# Patient Record
Sex: FEMALE | Race: WHITE | NOT HISPANIC OR LATINO | Employment: UNEMPLOYED | URBAN - METROPOLITAN AREA
[De-identification: names, ages, dates, MRNs, and addresses within clinical notes are randomized per-mention and may not be internally consistent; named-entity substitution may affect disease eponyms.]

---

## 2018-01-13 ENCOUNTER — APPOINTMENT (OUTPATIENT)
Dept: RADIOLOGY | Facility: MEDICAL CENTER | Age: 28
DRG: 493 | End: 2018-01-13
Attending: EMERGENCY MEDICINE
Payer: MEDICAID

## 2018-01-14 ENCOUNTER — RESOLUTE PROFESSIONAL BILLING HOSPITAL PROF FEE (OUTPATIENT)
Dept: MEDSURG UNIT | Facility: MEDICAL CENTER | Age: 28
End: 2018-01-14
Payer: MEDICAID

## 2018-01-14 ENCOUNTER — APPOINTMENT (OUTPATIENT)
Dept: RADIOLOGY | Facility: MEDICAL CENTER | Age: 28
DRG: 493 | End: 2018-01-14
Attending: EMERGENCY MEDICINE
Payer: MEDICAID

## 2018-01-14 ENCOUNTER — HOSPITAL ENCOUNTER (INPATIENT)
Facility: MEDICAL CENTER | Age: 28
LOS: 3 days | DRG: 493 | End: 2018-01-18
Attending: EMERGENCY MEDICINE | Admitting: ORTHOPAEDIC SURGERY
Payer: MEDICAID

## 2018-01-14 DIAGNOSIS — S82.131A RIGHT MEDIAL TIBIAL PLATEAU FRACTURE, CLOSED, INITIAL ENCOUNTER: ICD-10-CM

## 2018-01-14 DIAGNOSIS — S82.121A CLOSED DISPLACED FRACTURE OF LATERAL CONDYLE OF RIGHT TIBIA, INITIAL ENCOUNTER: ICD-10-CM

## 2018-01-14 DIAGNOSIS — S00.81XA ABRASION OF FOREHEAD, INITIAL ENCOUNTER: ICD-10-CM

## 2018-01-14 DIAGNOSIS — S09.90XA CLOSED HEAD INJURY, INITIAL ENCOUNTER: ICD-10-CM

## 2018-01-14 DIAGNOSIS — S42.421A CLOSED DISPLACED COMMINUTED SUPRACONDYLAR FRACTURE OF RIGHT HUMERUS WITHOUT INTERCONDYLAR FRACTURE, INITIAL ENCOUNTER: ICD-10-CM

## 2018-01-14 PROBLEM — S42.351A: Status: ACTIVE | Noted: 2018-01-14

## 2018-01-14 LAB — HCG UR QL: NEGATIVE

## 2018-01-14 PROCEDURE — G0378 HOSPITAL OBSERVATION PER HR: HCPCS

## 2018-01-14 PROCEDURE — 73700 CT LOWER EXTREMITY W/O DYE: CPT | Mod: RT

## 2018-01-14 PROCEDURE — 70450 CT HEAD/BRAIN W/O DYE: CPT

## 2018-01-14 PROCEDURE — 305948 HCHG GREEN TRAUMA ACT PRE-NOTIFY NO CC

## 2018-01-14 PROCEDURE — 96374 THER/PROPH/DIAG INJ IV PUSH: CPT

## 2018-01-14 PROCEDURE — 73070 X-RAY EXAM OF ELBOW: CPT | Mod: RT

## 2018-01-14 PROCEDURE — 700105 HCHG RX REV CODE 258: Performed by: EMERGENCY MEDICINE

## 2018-01-14 PROCEDURE — 81025 URINE PREGNANCY TEST: CPT

## 2018-01-14 PROCEDURE — 700102 HCHG RX REV CODE 250 W/ 637 OVERRIDE(OP): Performed by: PHYSICIAN ASSISTANT

## 2018-01-14 PROCEDURE — 700111 HCHG RX REV CODE 636 W/ 250 OVERRIDE (IP): Performed by: EMERGENCY MEDICINE

## 2018-01-14 PROCEDURE — 99285 EMERGENCY DEPT VISIT HI MDM: CPT

## 2018-01-14 PROCEDURE — 73564 X-RAY EXAM KNEE 4 OR MORE: CPT | Mod: RT

## 2018-01-14 PROCEDURE — A9270 NON-COVERED ITEM OR SERVICE: HCPCS | Performed by: PHYSICIAN ASSISTANT

## 2018-01-14 PROCEDURE — 73200 CT UPPER EXTREMITY W/O DYE: CPT | Mod: RT

## 2018-01-14 RX ORDER — HYDROCODONE BITARTRATE AND ACETAMINOPHEN 10; 325 MG/1; MG/1
1-2 TABLET ORAL EVERY 4 HOURS PRN
Status: DISCONTINUED | OUTPATIENT
Start: 2018-01-14 | End: 2018-01-18 | Stop reason: HOSPADM

## 2018-01-14 RX ORDER — SODIUM CHLORIDE 9 MG/ML
INJECTION, SOLUTION INTRAVENOUS CONTINUOUS
Status: DISCONTINUED | OUTPATIENT
Start: 2018-01-15 | End: 2018-01-18 | Stop reason: HOSPADM

## 2018-01-14 RX ORDER — DIAZEPAM 5 MG/1
5 TABLET ORAL
Status: COMPLETED | OUTPATIENT
Start: 2018-01-14 | End: 2018-01-15

## 2018-01-14 RX ORDER — ONDANSETRON 2 MG/ML
4 INJECTION INTRAMUSCULAR; INTRAVENOUS
Status: DISCONTINUED | OUTPATIENT
Start: 2018-01-14 | End: 2018-01-18 | Stop reason: HOSPADM

## 2018-01-14 RX ORDER — HYDROMORPHONE HYDROCHLORIDE 2 MG/ML
2 INJECTION, SOLUTION INTRAMUSCULAR; INTRAVENOUS; SUBCUTANEOUS
Status: DISCONTINUED | OUTPATIENT
Start: 2018-01-14 | End: 2018-01-18 | Stop reason: HOSPADM

## 2018-01-14 RX ORDER — HYDROMORPHONE HYDROCHLORIDE 2 MG/ML
1 INJECTION, SOLUTION INTRAMUSCULAR; INTRAVENOUS; SUBCUTANEOUS
Status: COMPLETED | OUTPATIENT
Start: 2018-01-14 | End: 2018-01-14

## 2018-01-14 RX ADMIN — SODIUM CHLORIDE: 9 INJECTION, SOLUTION INTRAVENOUS at 23:13

## 2018-01-14 RX ADMIN — FENTANYL CITRATE 100 MCG: 50 INJECTION, SOLUTION INTRAMUSCULAR; INTRAVENOUS at 20:22

## 2018-01-14 RX ADMIN — HYDROCODONE BITARTRATE AND ACETAMINOPHEN 2 TABLET: 10; 325 TABLET ORAL at 23:06

## 2018-01-14 RX ADMIN — HYDROMORPHONE HYDROCHLORIDE 1 MG: 2 INJECTION INTRAMUSCULAR; INTRAVENOUS; SUBCUTANEOUS at 21:53

## 2018-01-14 ASSESSMENT — LIFESTYLE VARIABLES
EVER_SMOKED: YES
DO YOU DRINK ALCOHOL: NO
ALCOHOL_USE: NO

## 2018-01-14 ASSESSMENT — PATIENT HEALTH QUESTIONNAIRE - PHQ9
SUM OF ALL RESPONSES TO PHQ QUESTIONS 1-9: 0
SUM OF ALL RESPONSES TO PHQ9 QUESTIONS 1 AND 2: 0
2. FEELING DOWN, DEPRESSED, IRRITABLE, OR HOPELESS: NOT AT ALL
1. LITTLE INTEREST OR PLEASURE IN DOING THINGS: NOT AT ALL

## 2018-01-14 ASSESSMENT — PAIN SCALES - GENERAL: PAINLEVEL_OUTOF10: 8

## 2018-01-15 PROCEDURE — A9270 NON-COVERED ITEM OR SERVICE: HCPCS | Performed by: PHYSICIAN ASSISTANT

## 2018-01-15 PROCEDURE — 700102 HCHG RX REV CODE 250 W/ 637 OVERRIDE(OP): Performed by: PHYSICIAN ASSISTANT

## 2018-01-15 PROCEDURE — 700105 HCHG RX REV CODE 258: Performed by: EMERGENCY MEDICINE

## 2018-01-15 PROCEDURE — 700111 HCHG RX REV CODE 636 W/ 250 OVERRIDE (IP): Performed by: ORTHOPAEDIC SURGERY

## 2018-01-15 PROCEDURE — 770006 HCHG ROOM/CARE - MED/SURG/GYN SEMI*

## 2018-01-15 PROCEDURE — G0378 HOSPITAL OBSERVATION PER HR: HCPCS

## 2018-01-15 PROCEDURE — 700111 HCHG RX REV CODE 636 W/ 250 OVERRIDE (IP): Performed by: PHYSICIAN ASSISTANT

## 2018-01-15 RX ORDER — LORAZEPAM 1 MG/1
1 TABLET ORAL EVERY 4 HOURS PRN
Status: DISCONTINUED | OUTPATIENT
Start: 2018-01-15 | End: 2018-01-18 | Stop reason: HOSPADM

## 2018-01-15 RX ORDER — NICOTINE 21 MG/24HR
14 PATCH, TRANSDERMAL 24 HOURS TRANSDERMAL
Status: DISCONTINUED | OUTPATIENT
Start: 2018-01-15 | End: 2018-01-18 | Stop reason: HOSPADM

## 2018-01-15 RX ORDER — LORAZEPAM 2 MG/ML
1 INJECTION INTRAMUSCULAR ONCE
Status: COMPLETED | OUTPATIENT
Start: 2018-01-15 | End: 2018-01-15

## 2018-01-15 RX ADMIN — LORAZEPAM 1 MG: 2 INJECTION INTRAMUSCULAR; INTRAVENOUS at 06:16

## 2018-01-15 RX ADMIN — LORAZEPAM 1 MG: 1 TABLET ORAL at 21:32

## 2018-01-15 RX ADMIN — DIAZEPAM 5 MG: 5 TABLET ORAL at 05:40

## 2018-01-15 RX ADMIN — HYDROMORPHONE HYDROCHLORIDE 2 MG: 2 INJECTION INTRAMUSCULAR; INTRAVENOUS; SUBCUTANEOUS at 09:54

## 2018-01-15 RX ADMIN — HYDROMORPHONE HYDROCHLORIDE 2 MG: 2 INJECTION INTRAMUSCULAR; INTRAVENOUS; SUBCUTANEOUS at 13:41

## 2018-01-15 RX ADMIN — ENOXAPARIN SODIUM 40 MG: 100 INJECTION SUBCUTANEOUS at 07:42

## 2018-01-15 RX ADMIN — LORAZEPAM 1 MG: 1 TABLET ORAL at 11:03

## 2018-01-15 RX ADMIN — HYDROCODONE BITARTRATE AND ACETAMINOPHEN 2 TABLET: 10; 325 TABLET ORAL at 22:45

## 2018-01-15 RX ADMIN — LORAZEPAM 1 MG: 1 TABLET ORAL at 15:48

## 2018-01-15 RX ADMIN — HYDROMORPHONE HYDROCHLORIDE 2 MG: 2 INJECTION INTRAMUSCULAR; INTRAVENOUS; SUBCUTANEOUS at 18:30

## 2018-01-15 RX ADMIN — NICOTINE 14 MG: 14 PATCH, EXTENDED RELEASE TRANSDERMAL at 11:03

## 2018-01-15 RX ADMIN — HYDROMORPHONE HYDROCHLORIDE 2 MG: 2 INJECTION INTRAMUSCULAR; INTRAVENOUS; SUBCUTANEOUS at 21:30

## 2018-01-15 RX ADMIN — HYDROCODONE BITARTRATE AND ACETAMINOPHEN 1 TABLET: 10; 325 TABLET ORAL at 07:42

## 2018-01-15 RX ADMIN — HYDROMORPHONE HYDROCHLORIDE 2 MG: 2 INJECTION INTRAMUSCULAR; INTRAVENOUS; SUBCUTANEOUS at 05:40

## 2018-01-15 RX ADMIN — SODIUM CHLORIDE: 9 INJECTION, SOLUTION INTRAVENOUS at 21:46

## 2018-01-15 RX ADMIN — HYDROCODONE BITARTRATE AND ACETAMINOPHEN 2 TABLET: 10; 325 TABLET ORAL at 12:24

## 2018-01-15 RX ADMIN — SODIUM CHLORIDE: 9 INJECTION, SOLUTION INTRAVENOUS at 06:23

## 2018-01-15 RX ADMIN — HYDROCODONE BITARTRATE AND ACETAMINOPHEN 1 TABLET: 10; 325 TABLET ORAL at 16:48

## 2018-01-15 ASSESSMENT — PAIN SCALES - GENERAL
PAINLEVEL_OUTOF10: 8

## 2018-01-15 NOTE — PROGRESS NOTES
Hinged knee brace (unlocked) was delivered and fitted to patient RLE.  If any further assistance needed, please call extension [phone] or place order for Ortho Technician assistance as a communication order in [facility].

## 2018-01-15 NOTE — ED NOTES
No home medications confirmed by interview with patient at bedside  No abx in past 30 days  NKDA confirmed

## 2018-01-15 NOTE — PROGRESS NOTES
2 RN Skin Check:     Skin check performed. Patient has abrasion to top of right scalp near forehead. Some scattered bruising and scratches. No open wounds, no pressure wounds and no areas of breakdown noted. Splint to right arm. Swelling to lower right leg.

## 2018-01-15 NOTE — PROGRESS NOTES
Nurse reports that patient is smoker and asking for nicotine patch 14mg patch ordered  Patient is also very anxious likely secondary to pain and heroin withdrawal- atavan 1mg PO Q4prn ordered

## 2018-01-15 NOTE — CARE PLAN
Problem: Safety  Goal: Will remain free from injury  Outcome: PROGRESSING AS EXPECTED  Bed alarm refused, A&O x4, slipper socks, bed locked and in low position, call light and personal belongings with reach, report given to CNA, appropriate signs outside door, hourly rounding in place.     Problem: Venous Thromboembolism (VTW)/Deep Vein Thrombosis (DVT) Prevention:  Goal: Patient will participate in Venous Thrombosis (VTE)/Deep Vein Thrombosis (DVT)Prevention Measures  Outcome: PROGRESSING AS EXPECTED  SCD's in place. Lovenox given.     Problem: Pain Management  Goal: Pain level will decrease to patient's comfort goal  Outcome: PROGRESSING AS EXPECTED  Pt rates pain 8/10. Norco 2 tab given.     Problem: Psychosocial Needs:  Goal: Level of anxiety will decrease  Outcome: PROGRESSING AS EXPECTED  Pt anxious. Listen to the pt. Gave emotional support.

## 2018-01-15 NOTE — PROGRESS NOTES
RN reports that patient c/o heroin withdrawal  Sleeping when I arrived  C/o right knee/elbow pain  No foot pumps on  + EPL/FPL/IO  CT scans reviewed  Will need ORIF elbow/knee  Discussed with patient  Opioid analgesia available  NWB RUE/RLE  DVT proph with foot pumps/lovenox  Knee immobilizer  OR tomorrow

## 2018-01-15 NOTE — PROGRESS NOTES
Patient arrived to T324-2 from ED via gurney. Patient needed slide board and staff assistance to get to hospital bed. Patient has a fracture to right leg and right arm. Right arm is in splint. Pillows in use for additional support. Patient is A&Ox4, on room air. Patient reports a significant amount of pain since ED despite this RN administering 1mg IV dilaudid. Patient is also very anxious. This RN placed call to NICOLAS Murrell who is on call for Dr. Ghotra. Orders received for pain meds and anxiolytic. Per orders patient is NPO at midnight or the potential for surgery in the AM. PA reports the surgery may be done Tuesday morning. Dr. Ghotra to see patient in AM. Plan of care and safety precautions reviewed with patient. Call device in reach Will continue to monitor.
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
## 2018-01-15 NOTE — PROGRESS NOTES
Patient is anxious and tearful this morning. She is restless and unable to sit/lie still in bed. She reports to this RN she is a daily heroin user and is currently going through withdrawals. A call was placed to NICOLAS Toth, who is on call for Dr. Ghotra. A one-time dose of Ativan 1mg was ordered and it is requested that this be addressed with Dr. Ghotra during rounds with the possibility of bringing hospitalist on board to help manage withdrawals. Will discuss with day RN as well.

## 2018-01-15 NOTE — CARE PLAN
Problem: Pain Management  Goal: Pain level will decrease to patient's comfort goal  Outcome: PROGRESSING AS EXPECTED  Pain medications administered as needed. Pillows in use for support/positioning.     Problem: Psychosocial Needs:  Goal: Level of anxiety will decrease  Outcome: PROGRESSING AS EXPECTED  Reassurance offered. Valium ordered, one time dose for anxiety.
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
## 2018-01-15 NOTE — PROGRESS NOTES
Dr. Ghotra presented to patient room. He was made aware of patient's concerns regarding heroin withdrawal.

## 2018-01-15 NOTE — PROGRESS NOTES
Assumed care of pt @0700. Bedside report received. Pt AOX 4. Pt rates pain 8/10. Norco 2 tab given. Pt anxious. Splint to RUE, brace to RLE in place. Last BM 01/13. Surgery tomorrow. Fall precaution in place. POC discussed with pt, all questions answered at this time. Pt makes needs known, call light within reach, hourly rounding in place.

## 2018-01-16 ENCOUNTER — APPOINTMENT (OUTPATIENT)
Dept: RADIOLOGY | Facility: MEDICAL CENTER | Age: 28
DRG: 493 | End: 2018-01-16
Attending: ORTHOPAEDIC SURGERY
Payer: MEDICAID

## 2018-01-16 PROCEDURE — C1713 ANCHOR/SCREW BN/BN,TIS/BN: HCPCS | Performed by: ORTHOPAEDIC SURGERY

## 2018-01-16 PROCEDURE — 160036 HCHG PACU - EA ADDL 30 MINS PHASE I: Performed by: ORTHOPAEDIC SURGERY

## 2018-01-16 PROCEDURE — 0PSF04Z REPOSITION RIGHT HUMERAL SHAFT WITH INTERNAL FIXATION DEVICE, OPEN APPROACH: ICD-10-PCS | Performed by: ORTHOPAEDIC SURGERY

## 2018-01-16 PROCEDURE — A9270 NON-COVERED ITEM OR SERVICE: HCPCS | Performed by: PHYSICIAN ASSISTANT

## 2018-01-16 PROCEDURE — A4314 CATH W/DRAINAGE 2-WAY LATEX: HCPCS | Performed by: ORTHOPAEDIC SURGERY

## 2018-01-16 PROCEDURE — HZ2ZZZZ DETOXIFICATION SERVICES FOR SUBSTANCE ABUSE TREATMENT: ICD-10-PCS | Performed by: ORTHOPAEDIC SURGERY

## 2018-01-16 PROCEDURE — 700101 HCHG RX REV CODE 250

## 2018-01-16 PROCEDURE — A6222 GAUZE <=16 IN NO W/SAL W/O B: HCPCS | Performed by: ORTHOPAEDIC SURGERY

## 2018-01-16 PROCEDURE — 700102 HCHG RX REV CODE 250 W/ 637 OVERRIDE(OP): Performed by: PHYSICIAN ASSISTANT

## 2018-01-16 PROCEDURE — 700111 HCHG RX REV CODE 636 W/ 250 OVERRIDE (IP): Performed by: ORTHOPAEDIC SURGERY

## 2018-01-16 PROCEDURE — 160048 HCHG OR STATISTICAL LEVEL 1-5: Performed by: ORTHOPAEDIC SURGERY

## 2018-01-16 PROCEDURE — 700111 HCHG RX REV CODE 636 W/ 250 OVERRIDE (IP)

## 2018-01-16 PROCEDURE — A9270 NON-COVERED ITEM OR SERVICE: HCPCS

## 2018-01-16 PROCEDURE — 700111 HCHG RX REV CODE 636 W/ 250 OVERRIDE (IP): Performed by: PHYSICIAN ASSISTANT

## 2018-01-16 PROCEDURE — 160029 HCHG SURGERY MINUTES - 1ST 30 MINS LEVEL 4: Performed by: ORTHOPAEDIC SURGERY

## 2018-01-16 PROCEDURE — 160002 HCHG RECOVERY MINUTES (STAT): Performed by: ORTHOPAEDIC SURGERY

## 2018-01-16 PROCEDURE — 700105 HCHG RX REV CODE 258: Performed by: EMERGENCY MEDICINE

## 2018-01-16 PROCEDURE — 700102 HCHG RX REV CODE 250 W/ 637 OVERRIDE(OP)

## 2018-01-16 PROCEDURE — 770006 HCHG ROOM/CARE - MED/SURG/GYN SEMI*

## 2018-01-16 PROCEDURE — 500891 HCHG PACK, ORTHO MAJOR: Performed by: ORTHOPAEDIC SURGERY

## 2018-01-16 PROCEDURE — 160009 HCHG ANES TIME/MIN: Performed by: ORTHOPAEDIC SURGERY

## 2018-01-16 PROCEDURE — 73070 X-RAY EXAM OF ELBOW: CPT | Mod: RT

## 2018-01-16 PROCEDURE — 501838 HCHG SUTURE GENERAL: Performed by: ORTHOPAEDIC SURGERY

## 2018-01-16 PROCEDURE — 0QSG04Z REPOSITION RIGHT TIBIA WITH INTERNAL FIXATION DEVICE, OPEN APPROACH: ICD-10-PCS | Performed by: ORTHOPAEDIC SURGERY

## 2018-01-16 PROCEDURE — 73560 X-RAY EXAM OF KNEE 1 OR 2: CPT | Mod: RT

## 2018-01-16 PROCEDURE — 0P8K0ZZ DIVISION OF RIGHT ULNA, OPEN APPROACH: ICD-10-PCS | Performed by: ORTHOPAEDIC SURGERY

## 2018-01-16 PROCEDURE — 160035 HCHG PACU - 1ST 60 MINS PHASE I: Performed by: ORTHOPAEDIC SURGERY

## 2018-01-16 PROCEDURE — 500380 HCHG DRAIN, PENROSE 1/4X12: Performed by: ORTHOPAEDIC SURGERY

## 2018-01-16 PROCEDURE — 502000 HCHG MISC OR IMPLANTS RC 0278: Performed by: ORTHOPAEDIC SURGERY

## 2018-01-16 PROCEDURE — 0QUG0KZ SUPPLEMENT RIGHT TIBIA WITH NONAUTOLOGOUS TISSUE SUBSTITUTE, OPEN APPROACH: ICD-10-PCS | Performed by: ORTHOPAEDIC SURGERY

## 2018-01-16 PROCEDURE — A6402 STERILE GAUZE <= 16 SQ IN: HCPCS | Performed by: ORTHOPAEDIC SURGERY

## 2018-01-16 PROCEDURE — 160041 HCHG SURGERY MINUTES - EA ADDL 1 MIN LEVEL 4: Performed by: ORTHOPAEDIC SURGERY

## 2018-01-16 PROCEDURE — 110372 HCHG SHELL REV 278: Performed by: ORTHOPAEDIC SURGERY

## 2018-01-16 PROCEDURE — 500123 HCHG BOVIE, CONTROL W/BLADE: Performed by: ORTHOPAEDIC SURGERY

## 2018-01-16 DEVICE — IMPLANTABLE DEVICE: Type: IMPLANTABLE DEVICE | Site: HUMERUS | Status: FUNCTIONAL

## 2018-01-16 DEVICE — IMPLANTABLE DEVICE: Type: IMPLANTABLE DEVICE | Site: ELBOW | Status: FUNCTIONAL

## 2018-01-16 DEVICE — SCREW CORT 3.5X20MM ST HEX (4TX6+1TX4+1TX3=31)(SDS=22): Type: IMPLANTABLE DEVICE | Site: HUMERUS | Status: FUNCTIONAL

## 2018-01-16 DEVICE — WIRE K 1.25 X 150 292.12 (7TX10+2TX6+1TX20=102) CYC40 SM20 (10EA/PK): Type: IMPLANTABLE DEVICE | Site: HUMERUS | Status: FUNCTIONAL

## 2018-01-16 DEVICE — SCREW 3.5 MM NON-LOCKING TI X 60MM LONG (6TX8=48): Type: IMPLANTABLE DEVICE | Site: TIBIA | Status: FUNCTIONAL

## 2018-01-16 DEVICE — SCREW CORT 3.5X18MM ST HEX (4TX6+1TX4+1TX3=31)(SDS=22): Type: IMPLANTABLE DEVICE | Site: HUMERUS | Status: FUNCTIONAL

## 2018-01-16 DEVICE — PLATE LATERIAL PROXIMAL TIBIA 4H RIGHT (2TX1=2): Type: IMPLANTABLE DEVICE | Site: TIBIA | Status: FUNCTIONAL

## 2018-01-16 DEVICE — BONE CHIPS CANC 4-10MM 15CC - CRUSHED  FREEZE DRIED ONLY: Type: IMPLANTABLE DEVICE | Site: ELBOW | Status: FUNCTIONAL

## 2018-01-16 DEVICE — SCREW CORT 3.5X16MM ST HEX (4TX6+1TX4+1TX3=31)(SDS=22): Type: IMPLANTABLE DEVICE | Site: HUMERUS | Status: FUNCTIONAL

## 2018-01-16 DEVICE — SCREW 3.5 MM NON-LOCKING TI X 65MM LONG (6TX8=48): Type: IMPLANTABLE DEVICE | Site: TIBIA | Status: FUNCTIONAL

## 2018-01-16 DEVICE — SCREW CORT 3.5X24MM ST HEX (4TX6+1TX4+1TX3=31)(SDS=22): Type: IMPLANTABLE DEVICE | Site: HUMERUS | Status: FUNCTIONAL

## 2018-01-16 DEVICE — SCREW CORT 3.5X50MM ST HEX (4TX6+1T3=27)(SDS=18): Type: IMPLANTABLE DEVICE | Site: HUMERUS | Status: FUNCTIONAL

## 2018-01-16 DEVICE — SCREW CORT 3.5X22MM ST HEX (4TX6+1TX4+1TX3=31)(SDS=22): Type: IMPLANTABLE DEVICE | Site: HUMERUS | Status: FUNCTIONAL

## 2018-01-16 DEVICE — SCREW 3.5 MM NON-LOCKING TI X 32MM LONG (6TX8=48): Type: IMPLANTABLE DEVICE | Site: TIBIA | Status: FUNCTIONAL

## 2018-01-16 RX ORDER — GABAPENTIN 300 MG/1
CAPSULE ORAL
Status: COMPLETED
Start: 2018-01-16 | End: 2018-01-16

## 2018-01-16 RX ORDER — CELECOXIB 200 MG/1
CAPSULE ORAL
Status: COMPLETED
Start: 2018-01-16 | End: 2018-01-16

## 2018-01-16 RX ORDER — HYDROMORPHONE HYDROCHLORIDE 2 MG/ML
INJECTION, SOLUTION INTRAMUSCULAR; INTRAVENOUS; SUBCUTANEOUS
Status: COMPLETED
Start: 2018-01-16 | End: 2018-01-16

## 2018-01-16 RX ORDER — MORPHINE SULFATE 10 MG/ML
INJECTION, SOLUTION INTRAMUSCULAR; INTRAVENOUS
Status: COMPLETED
Start: 2018-01-16 | End: 2018-01-16

## 2018-01-16 RX ORDER — MAGNESIUM HYDROXIDE 1200 MG/15ML
LIQUID ORAL
Status: DISCONTINUED | OUTPATIENT
Start: 2018-01-16 | End: 2018-01-16 | Stop reason: HOSPADM

## 2018-01-16 RX ORDER — BUPIVACAINE HYDROCHLORIDE 5 MG/ML
INJECTION, SOLUTION EPIDURAL; INTRACAUDAL
Status: DISCONTINUED | OUTPATIENT
Start: 2018-01-16 | End: 2018-01-16 | Stop reason: HOSPADM

## 2018-01-16 RX ADMIN — HYDROMORPHONE HYDROCHLORIDE 0.5 MG: 2 INJECTION INTRAMUSCULAR; INTRAVENOUS; SUBCUTANEOUS at 17:05

## 2018-01-16 RX ADMIN — GABAPENTIN 300 MG: 300 CAPSULE ORAL at 11:14

## 2018-01-16 RX ADMIN — HYDROCODONE BITARTRATE AND ACETAMINOPHEN 2 TABLET: 10; 325 TABLET ORAL at 20:07

## 2018-01-16 RX ADMIN — HYDROMORPHONE HYDROCHLORIDE 2 MG: 2 INJECTION INTRAMUSCULAR; INTRAVENOUS; SUBCUTANEOUS at 09:29

## 2018-01-16 RX ADMIN — HYDROMORPHONE HYDROCHLORIDE 0.5 MG: 2 INJECTION INTRAMUSCULAR; INTRAVENOUS; SUBCUTANEOUS at 17:10

## 2018-01-16 RX ADMIN — LORAZEPAM 1 MG: 1 TABLET ORAL at 01:33

## 2018-01-16 RX ADMIN — SODIUM CHLORIDE: 9 INJECTION, SOLUTION INTRAVENOUS at 18:44

## 2018-01-16 RX ADMIN — HYDROMORPHONE HYDROCHLORIDE 0.5 MG: 2 INJECTION INTRAMUSCULAR; INTRAVENOUS; SUBCUTANEOUS at 17:20

## 2018-01-16 RX ADMIN — FENTANYL CITRATE 50 MCG: 50 INJECTION, SOLUTION INTRAMUSCULAR; INTRAVENOUS at 16:57

## 2018-01-16 RX ADMIN — CELECOXIB 400 MG: 200 CAPSULE ORAL at 11:14

## 2018-01-16 RX ADMIN — FENTANYL CITRATE 50 MCG: 50 INJECTION, SOLUTION INTRAMUSCULAR; INTRAVENOUS at 16:46

## 2018-01-16 RX ADMIN — HYDROMORPHONE HYDROCHLORIDE 0.5 MG: 2 INJECTION INTRAMUSCULAR; INTRAVENOUS; SUBCUTANEOUS at 16:52

## 2018-01-16 RX ADMIN — HYDROMORPHONE HYDROCHLORIDE 2 MG: 2 INJECTION INTRAMUSCULAR; INTRAVENOUS; SUBCUTANEOUS at 01:34

## 2018-01-16 RX ADMIN — LORAZEPAM 1 MG: 1 TABLET ORAL at 20:32

## 2018-01-16 RX ADMIN — HYDROMORPHONE HYDROCHLORIDE 2 MG: 2 INJECTION INTRAMUSCULAR; INTRAVENOUS; SUBCUTANEOUS at 21:25

## 2018-01-16 RX ADMIN — MORPHINE SULFATE 5 MG: 10 INJECTION INTRAVENOUS at 17:38

## 2018-01-16 RX ADMIN — MORPHINE SULFATE 5 MG: 10 INJECTION INTRAVENOUS at 17:28

## 2018-01-16 RX ADMIN — CEFAZOLIN SODIUM 1 G: 1 INJECTION, SOLUTION INTRAVENOUS at 20:32

## 2018-01-16 RX ADMIN — HYDROMORPHONE HYDROCHLORIDE 2 MG: 2 INJECTION INTRAMUSCULAR; INTRAVENOUS; SUBCUTANEOUS at 04:40

## 2018-01-16 RX ADMIN — HYDROCODONE BITARTRATE AND ACETAMINOPHEN 2 TABLET: 10; 325 TABLET ORAL at 04:39

## 2018-01-16 ASSESSMENT — PAIN SCALES - GENERAL
PAINLEVEL_OUTOF10: ASSUMED PAIN PRESENT
PAINLEVEL_OUTOF10: 5
PAINLEVEL_OUTOF10: 10
PAINLEVEL_OUTOF10: 9
PAINLEVEL_OUTOF10: 6
PAINLEVEL_OUTOF10: 8
PAINLEVEL_OUTOF10: 8
PAINLEVEL_OUTOF10: 10
PAINLEVEL_OUTOF10: 8
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
## 2018-01-16 NOTE — PROGRESS NOTES
Plan ORIF right distal humerus, possible ORIF right tibial plateau if time allows  Discussed risks with patient  Right distal humerus fx very distal and severely comminuted, will require olecranon osteotomy  Right arm and leg marked  Consent signed

## 2018-01-16 NOTE — CARE PLAN
Problem: Safety  Goal: Will remain free from injury  Outcome: PROGRESSING AS EXPECTED  Treaded socks in place, bed in the lowest position, call light and belongings within reach, pt call for assistance appropriately    Problem: Pain Management  Goal: Pain level will decrease to patient's comfort goal  Outcome: PROGRESSING AS EXPECTED

## 2018-01-16 NOTE — CARE PLAN
Problem: Knowledge Deficit  Goal: Knowledge of disease process/condition, treatment plan, diagnostic tests, and medications will improve  Outcome: PROGRESSING AS EXPECTED  Reviewed plan of care, NPO status  For surgery 1/16, pt acknowledges    Problem: Pain Management  Goal: Pain level will decrease to patient's comfort goal  Outcome: PROGRESSING SLOWER THAN EXPECTED  Reviewed medications  When they can been given, elevating, distraction, deep briefing.,. Med with dilaudid q3h for severe pain

## 2018-01-17 PROBLEM — F11.20 OPIATE DEPENDENCE (HCC): Status: ACTIVE | Noted: 2018-01-17

## 2018-01-17 PROCEDURE — A9270 NON-COVERED ITEM OR SERVICE: HCPCS | Performed by: PHYSICIAN ASSISTANT

## 2018-01-17 PROCEDURE — 700111 HCHG RX REV CODE 636 W/ 250 OVERRIDE (IP): Performed by: ORTHOPAEDIC SURGERY

## 2018-01-17 PROCEDURE — 700102 HCHG RX REV CODE 250 W/ 637 OVERRIDE(OP): Performed by: PHYSICIAN ASSISTANT

## 2018-01-17 PROCEDURE — 99223 1ST HOSP IP/OBS HIGH 75: CPT | Performed by: INTERNAL MEDICINE

## 2018-01-17 PROCEDURE — 97162 PT EVAL MOD COMPLEX 30 MIN: CPT

## 2018-01-17 PROCEDURE — G8978 MOBILITY CURRENT STATUS: HCPCS | Mod: CJ

## 2018-01-17 PROCEDURE — 97165 OT EVAL LOW COMPLEX 30 MIN: CPT

## 2018-01-17 PROCEDURE — G8988 SELF CARE GOAL STATUS: HCPCS | Mod: CI

## 2018-01-17 PROCEDURE — 770006 HCHG ROOM/CARE - MED/SURG/GYN SEMI*

## 2018-01-17 PROCEDURE — 700111 HCHG RX REV CODE 636 W/ 250 OVERRIDE (IP): Performed by: PHYSICIAN ASSISTANT

## 2018-01-17 PROCEDURE — G8987 SELF CARE CURRENT STATUS: HCPCS | Mod: CK

## 2018-01-17 PROCEDURE — 700105 HCHG RX REV CODE 258: Performed by: EMERGENCY MEDICINE

## 2018-01-17 PROCEDURE — G8979 MOBILITY GOAL STATUS: HCPCS | Mod: CI

## 2018-01-17 RX ADMIN — HYDROMORPHONE HYDROCHLORIDE 2 MG: 2 INJECTION INTRAMUSCULAR; INTRAVENOUS; SUBCUTANEOUS at 09:38

## 2018-01-17 RX ADMIN — ENOXAPARIN SODIUM 40 MG: 100 INJECTION SUBCUTANEOUS at 09:37

## 2018-01-17 RX ADMIN — HYDROCODONE BITARTRATE AND ACETAMINOPHEN 2 TABLET: 10; 325 TABLET ORAL at 17:03

## 2018-01-17 RX ADMIN — HYDROCODONE BITARTRATE AND ACETAMINOPHEN 2 TABLET: 10; 325 TABLET ORAL at 12:06

## 2018-01-17 RX ADMIN — CEFAZOLIN SODIUM 1 G: 1 INJECTION, SOLUTION INTRAVENOUS at 06:04

## 2018-01-17 RX ADMIN — HYDROMORPHONE HYDROCHLORIDE 2 MG: 2 INJECTION INTRAMUSCULAR; INTRAVENOUS; SUBCUTANEOUS at 06:04

## 2018-01-17 RX ADMIN — LORAZEPAM 1 MG: 1 TABLET ORAL at 14:45

## 2018-01-17 RX ADMIN — SODIUM CHLORIDE: 9 INJECTION, SOLUTION INTRAVENOUS at 03:45

## 2018-01-17 RX ADMIN — SODIUM CHLORIDE: 9 INJECTION, SOLUTION INTRAVENOUS at 09:38

## 2018-01-17 RX ADMIN — HYDROCODONE BITARTRATE AND ACETAMINOPHEN 2 TABLET: 10; 325 TABLET ORAL at 21:02

## 2018-01-17 RX ADMIN — HYDROCODONE BITARTRATE AND ACETAMINOPHEN 2 TABLET: 10; 325 TABLET ORAL at 03:45

## 2018-01-17 RX ADMIN — NICOTINE 14 MG: 14 PATCH, EXTENDED RELEASE TRANSDERMAL at 03:49

## 2018-01-17 RX ADMIN — HYDROMORPHONE HYDROCHLORIDE 2 MG: 2 INJECTION INTRAMUSCULAR; INTRAVENOUS; SUBCUTANEOUS at 12:47

## 2018-01-17 RX ADMIN — HYDROMORPHONE HYDROCHLORIDE 2 MG: 2 INJECTION INTRAMUSCULAR; INTRAVENOUS; SUBCUTANEOUS at 19:29

## 2018-01-17 RX ADMIN — CEFAZOLIN SODIUM 1 G: 1 INJECTION, SOLUTION INTRAVENOUS at 14:45

## 2018-01-17 ASSESSMENT — PAIN SCALES - GENERAL
PAINLEVEL_OUTOF10: 10
PAINLEVEL_OUTOF10: 8

## 2018-01-17 ASSESSMENT — COGNITIVE AND FUNCTIONAL STATUS - GENERAL
DAILY ACTIVITIY SCORE: 18
PERSONAL GROOMING: A LITTLE
MOVING FROM LYING ON BACK TO SITTING ON SIDE OF FLAT BED: A LITTLE
SUGGESTED CMS G CODE MODIFIER MOBILITY: CL
CLIMB 3 TO 5 STEPS WITH RAILING: TOTAL
MOBILITY SCORE: 14
TURNING FROM BACK TO SIDE WHILE IN FLAT BAD: A LITTLE
TOILETING: A LITTLE
EATING MEALS: A LITTLE
WALKING IN HOSPITAL ROOM: TOTAL
DRESSING REGULAR UPPER BODY CLOTHING: A LITTLE
DRESSING REGULAR LOWER BODY CLOTHING: A LITTLE
STANDING UP FROM CHAIR USING ARMS: A LITTLE
MOVING TO AND FROM BED TO CHAIR: A LITTLE
SUGGESTED CMS G CODE MODIFIER DAILY ACTIVITY: CK
HELP NEEDED FOR BATHING: A LITTLE

## 2018-01-17 ASSESSMENT — ACTIVITIES OF DAILY LIVING (ADL): TOILETING: INDEPENDENT

## 2018-01-17 ASSESSMENT — GAIT ASSESSMENTS: GAIT LEVEL OF ASSIST: UNABLE TO PARTICIPATE

## 2018-01-17 NOTE — PROGRESS NOTES
Received bedside shift report from night RN. Pt AOx4.  Pt reports pain is 10/10 pt medicated accordingly. Does call appropriately. Bed alarm is off.  Pt is sitting up in bed. PIV assessed and is patent. Pt is on 1 L NC. POC discussed as well as unit routine, comfort, and safety. Dicussed DC planning to home with HH and DMEs. Discussed the goal for today, PT and OT evaluation, decrease anxiety, pain control, mobility and dc planning. Reviewed orders, notes, labs, and test results. Hourly rounding in place with RN rounding on odd hours and CNA on even hours. 12 hour chart check completed.

## 2018-01-17 NOTE — PROGRESS NOTES
Pt. returned from surgery, cast and sling to BAYLEE, immobilizer to osvaldo CANAS in place.  Pt. resting comfortably in bed, no needs at this time, will continue to monitor.

## 2018-01-17 NOTE — OR NURSING
R arm in sling with fingers with good cap refill with tingling . R leg with immobilizer with good pulses and cap refill.
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
## 2018-01-17 NOTE — CARE PLAN
Problem: Mobility  Goal: Risk for activity intolerance will decrease  Outcome: PROGRESSING AS EXPECTED  Pt has worked with PT and OT. She is in the WC and tolerating it well    Problem: Pain Management  Goal: Pain level will decrease to patient's comfort goal  Outcome: PROGRESSING SLOWER THAN EXPECTED  Pain is at 10/10 and pt is crying and moaning. MD velazquez

## 2018-01-17 NOTE — PROGRESS NOTES
OOB to chair.  Doing well, pain controlled, tolerable with current pain regimen. Complains of numbness in ulna nerve distribution.    Vitals:    01/16/18 2000 01/16/18 2030 01/16/18 2318 01/17/18 0310   BP: 145/98 120/81 118/77 130/86   Pulse: (!) 108 (!) 106 69 89   Resp: 16 16 16 16   Temp: 36.4 °C (97.6 °F) 36.1 °C (97 °F) 36.2 °C (97.2 °F) 36.2 °C (97.1 °F)   SpO2: 99% 100% 97% 99%   Weight:       Height:         RUE:  Splint c/d/i  F/e digits, intrinsic function present  Sensation preserved over hand - subjective numbness ulna nerve distribution  Hand w/w/p    RLE:  melania brace in place  Dressing c/d/i  F/e ankle, toes  Sensation preserved over foot  Foot w/w/p    POD#1 s/p R distal humerus ORIF with olecranon osteotomy, R lateral tibial plateau ORIF    - pain control  - NWB RUE in splint, NWB RLE in melania unlocked, ROM as tolerated  - PT/OT  - Lovenox  - Dispo planning, follow-up 5-7 days at [facility] for wound check
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
## 2018-01-18 VITALS
DIASTOLIC BLOOD PRESSURE: 96 MMHG | BODY MASS INDEX: 23.39 KG/M2 | RESPIRATION RATE: 18 BRPM | HEIGHT: 64 IN | HEART RATE: 95 BPM | OXYGEN SATURATION: 98 % | WEIGHT: 137 LBS | SYSTOLIC BLOOD PRESSURE: 133 MMHG | TEMPERATURE: 98.7 F

## 2018-01-18 PROBLEM — S42.491A CLOSED BICONDYLAR FRACTURE OF DISTAL END OF RIGHT HUMERUS: Status: ACTIVE | Noted: 2018-01-18

## 2018-01-18 PROBLEM — S82.121A CLOSED DISPLACED FRACTURE OF LATERAL CONDYLE OF RIGHT TIBIA: Status: ACTIVE | Noted: 2018-01-18

## 2018-01-18 PROCEDURE — 97530 THERAPEUTIC ACTIVITIES: CPT

## 2018-01-18 PROCEDURE — 700102 HCHG RX REV CODE 250 W/ 637 OVERRIDE(OP): Performed by: PHYSICIAN ASSISTANT

## 2018-01-18 PROCEDURE — A9270 NON-COVERED ITEM OR SERVICE: HCPCS | Performed by: PHYSICIAN ASSISTANT

## 2018-01-18 PROCEDURE — 700105 HCHG RX REV CODE 258: Performed by: EMERGENCY MEDICINE

## 2018-01-18 PROCEDURE — 700111 HCHG RX REV CODE 636 W/ 250 OVERRIDE (IP): Performed by: ORTHOPAEDIC SURGERY

## 2018-01-18 PROCEDURE — 700111 HCHG RX REV CODE 636 W/ 250 OVERRIDE (IP): Performed by: PHYSICIAN ASSISTANT

## 2018-01-18 PROCEDURE — 99231 SBSQ HOSP IP/OBS SF/LOW 25: CPT | Performed by: HOSPITALIST

## 2018-01-18 RX ORDER — IBUPROFEN 200 MG
400 TABLET ORAL EVERY 8 HOURS PRN
COMMUNITY
Start: 2018-01-18

## 2018-01-18 RX ORDER — HYDROCODONE BITARTRATE AND ACETAMINOPHEN 10; 325 MG/1; MG/1
1 TABLET ORAL EVERY 4 HOURS PRN
Qty: 40 TAB | Refills: 0 | Status: SHIPPED | OUTPATIENT
Start: 2018-01-18 | End: 2018-01-25

## 2018-01-18 RX ADMIN — HYDROMORPHONE HYDROCHLORIDE 2 MG: 2 INJECTION INTRAMUSCULAR; INTRAVENOUS; SUBCUTANEOUS at 13:18

## 2018-01-18 RX ADMIN — HYDROCODONE BITARTRATE AND ACETAMINOPHEN 2 TABLET: 10; 325 TABLET ORAL at 01:37

## 2018-01-18 RX ADMIN — HYDROCODONE BITARTRATE AND ACETAMINOPHEN 2 TABLET: 10; 325 TABLET ORAL at 11:48

## 2018-01-18 RX ADMIN — HYDROMORPHONE HYDROCHLORIDE 2 MG: 2 INJECTION INTRAMUSCULAR; INTRAVENOUS; SUBCUTANEOUS at 07:47

## 2018-01-18 RX ADMIN — ENOXAPARIN SODIUM 40 MG: 100 INJECTION SUBCUTANEOUS at 07:47

## 2018-01-18 RX ADMIN — NICOTINE 14 MG: 14 PATCH, EXTENDED RELEASE TRANSDERMAL at 06:18

## 2018-01-18 RX ADMIN — SODIUM CHLORIDE: 9 INJECTION, SOLUTION INTRAVENOUS at 01:38

## 2018-01-18 RX ADMIN — HYDROCODONE BITARTRATE AND ACETAMINOPHEN 2 TABLET: 10; 325 TABLET ORAL at 06:18

## 2018-01-18 ASSESSMENT — ENCOUNTER SYMPTOMS
EYE REDNESS: 0
HEMOPTYSIS: 0
LOSS OF CONSCIOUSNESS: 0
COUGH: 0
NERVOUS/ANXIOUS: 0
VOMITING: 0
SEIZURES: 0
NAUSEA: 0
FEVER: 0
WEAKNESS: 1
FOCAL WEAKNESS: 0
CHILLS: 0
BLOOD IN STOOL: 0
WHEEZING: 0
WEAKNESS: 0
DIZZINESS: 0
ABDOMINAL PAIN: 0
TREMORS: 0
INSOMNIA: 0
MYALGIAS: 1
FALLS: 0
PALPITATIONS: 0
SHORTNESS OF BREATH: 0
HEADACHES: 0
DIARRHEA: 0
CONSTIPATION: 0
EYE PAIN: 0

## 2018-01-18 ASSESSMENT — PAIN SCALES - GENERAL
PAINLEVEL_OUTOF10: 6
PAINLEVEL_OUTOF10: 10
PAINLEVEL_OUTOF10: 10
PAINLEVEL_OUTOF10: 7

## 2018-01-18 ASSESSMENT — COGNITIVE AND FUNCTIONAL STATUS - GENERAL
SUGGESTED CMS G CODE MODIFIER MOBILITY: CK
MOVING FROM LYING ON BACK TO SITTING ON SIDE OF FLAT BED: A LITTLE
WALKING IN HOSPITAL ROOM: TOTAL
STANDING UP FROM CHAIR USING ARMS: A LITTLE
CLIMB 3 TO 5 STEPS WITH RAILING: TOTAL
MOBILITY SCORE: 16

## 2018-01-18 ASSESSMENT — GAIT ASSESSMENTS: GAIT LEVEL OF ASSIST: UNABLE TO PARTICIPATE

## 2018-01-18 ASSESSMENT — LIFESTYLE VARIABLES: SUBSTANCE_ABUSE: 1
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
## 2018-01-18 NOTE — THERAPY
"Occupational Therapy Evaluation completed.   Functional Status:  Pt pleasant, motivated to get OOB.  Pt educated on NWB RUE & RLE.  Pt instructed on correct way to wear sling RUE.  Pt required Min A to don underwear.  Pt was CGA for supine to sit EOB.  Pt able to pivot with LLE into chair with SBA.  Pt placed pillows under RUE & RLE to elevated & help with pain control.  Pt did require Min A with meal tray as she is limited to LUE only.  Pt instructed to perform ROM to fingers on her right hand.  Plan of Care: Will benefit from Occupational Therapy 3 times per week  Discharge Recommendations:  Equipment: Wheelchair and Tub Transfer Bench. Post-acute therapy Discharge to home with outpatient or home health for additional skilled therapy services.    Pt reports her  is building a ramp & her mom will also be able to help her upon D/C.    See \"Rehab Therapy-Acute\" Patient Summary Report for complete documentation.    "
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
## 2018-01-18 NOTE — CARE PLAN
Problem: Communication  Goal: The ability to communicate needs accurately and effectively will improve    Intervention: Educate patient and significant other/support system about the plan of care, procedures, treatments, medications and allow for questions  POC discussed with pt including plans for DC today.       Problem: Pain Management  Goal: Pain level will decrease to patient's comfort goal    Intervention: Follow pain managment plan developed in collaboration with patient and Interdisciplinary Team  Pain managed per MAR.

## 2018-01-18 NOTE — DISCHARGE PLANNING
Care Transition Team Assessment    SW met with pt at bedside.  SW offered resources for Heroin abuse.  Pt refused all resources and stated she has learned her lesson and will not use again..  Pt denies needing help.  Pt stated  does not use and is a tremendous support. Mother also is a source of support.      Information Source  Orientation : Oriented x 4  Information Given By: Patient  Informant's Name: Geovany donahue  Who is responsible for making decisions for patient? : Patient    Readmission Evaluation  Is this a readmission?: No    Elopement Risk  Legal Hold: No  Ambulatory or Self Mobile in Wheelchair: No-Not an Elopement Risk  Elopement Risk: Not at Risk for Elopement    Interdisciplinary Discharge Planning  Does Admitting Nurse Feel This Could be a Complex Discharge?: No  Primary Care Physician: None (Pt goes to urgent care or ER )  Lives with - Patient's Self Care Capacity: Spouse  Patient or legal guardian wants to designate a caregiver (see row info): No  Support Systems: Family Member(s)  Housing / Facility: [address]  Name of Care Facility: N/A  Do You Take your Prescribed Medications Regularly: Yes  Able to Return to Previous ADL's: Future Time w/Therapy  Mobility Issues: Yes  Prior Services: None  Patient Expects to be Discharged to:: Home  Assistance Needed: Unknown at this Time  Durable Medical Equipment: Not Applicable    Discharge Preparedness  What is your plan after discharge?: Home with help  What are your discharge supports?: Spouse, Parent  Prior Functional Level: Independent with Activities of Daily Living  Difficulity with ADLs: None  Difficulity with IADLs: None    Functional Assesment  Prior Functional Level: Independent with Activities of Daily Living    Finances  Financial Barriers to Discharge: No  Prescription Coverage: Yes ([facility] in [place])    Vision / Hearing Impairment  Vision Impairment : No  Hearing Impairment : No    Values / Beliefs / Concerns  Values / Beliefs  Concerns : No    Advance Directive  Advance Directive?: None  Advance Directive offered?: AD Booklet refused    Domestic Abuse  Have you ever been the victim of abuse or violence?: No  Physical Abuse or Sexual Abuse: No  Verbal Abuse or Emotional Abuse: No  Possible Abuse Reported to:: Not Applicable    Psychological Assessment  History of Substance Abuse: Heroin  Date Last Used - Heroin: 01/13/18  Substance Abuse Comments:  (Pt does not believe she needs help quitting.)  History of Psychiatric Problems: No  Non-compliant with Treatment: No    Discharge Risks or Barriers  Discharge risks or barriers?: Substance abuse  Patient risk factors: No PCP, Substance abuse    Anticipated Discharge Information  Anticipated discharge disposition: Home  Discharge Address: [id number], [place]
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
## 2018-01-18 NOTE — DISCHARGE INSTRUCTIONS
Jan 24, 2018 with Dr. Mian Roman MD at [facility]  * Non weightbearing on right arm and right leg              *Activity as tolerated  *Use assistive device for all activity  *Continue exercises provided by physical therapy  *Elevate leg as needed  *Ice as needed (20 minutes every 1-2 hours)  *Ok to shower with dressings covered  *No soaking of the incision; no baths, hot tubs, or swimming until cleared by doctor         *No driving until cleared by doctor  *Take medications as prescribed by doctor  *Call doctor’s office with any questions or concerns [phone]      Discharge Instructions    Discharged to home by car with relative. Discharged via wheelchair, hospital escort: Yes.  Special equipment needed: Wheelchair    Be sure to schedule a follow-up appointment with your primary care doctor or any specialists as instructed.     Discharge Plan:   Diet Plan: Discussed  Activity Level: Discussed  Smoking Cessation Offered: Patient Refused  Confirmed Follow up Appointment: Patient to Call and Schedule Appointment  Confirmed Symptoms Management: Discussed  Medication Reconciliation Updated: Yes  Influenza Vaccine Indication: Patient Refuses    I understand that a diet low in cholesterol, fat, and sodium is recommended for good health. Unless I have been given specific instructions below for another diet, I accept this instruction as my diet prescription.   Other diet: regular    Special Instructions: Discharge instructions for the Orthopedic Patient    Follow up with Primary Care Physician within 2 weeks of discharge to home, regarding:  Review of medications and diagnostic testing.  Surveillance for medical complications.  Workup and treatment of osteoporosis, if appropriate.     -Is this a Joint Replacement patient? No    -Is this patient being discharged with medication to prevent blood clots?  Yes, Lovenox Enoxaparin injection  What is this medicine?  ENOXAPARIN (ee nox a PA rin) is used after  knee, hip, or abdominal surgeries to prevent blood clotting. It is also used to treat existing blood clots in the lungs or in the veins.  This medicine may be used for other purposes; ask your health care provider or pharmacist if you have questions.  COMMON BRAND NAME(S): Lovenox  What should I tell my health care provider before I take this medicine?  They need to know if you have any of these conditions:  -bleeding disorders, hemorrhage, or hemophilia  -infection of the heart or heart valves  -kidney or liver disease  -previous stroke  -prosthetic heart valve  -recent surgery or delivery of a baby  -ulcer in the stomach or intestine, diverticulitis, or other bowel disease  -an unusual or allergic reaction to enoxaparin, heparin, pork or pork products, other medicines, foods, dyes, or preservatives  -pregnant or trying to get pregnant  -breast-feeding  How should I use this medicine?  This medicine is for injection under the skin. It is usually given by a health-care professional. You or a family member may be trained on how to give the injections. If you are to give yourself injections, make sure you understand how to use the syringe, measure the dose if necessary, and give the injection. To avoid bruising, do not rub the site where this medicine has been injected. Do not take your medicine more often than directed. Do not stop taking except on the advice of your doctor or health care professional.  Make sure you receive a puncture-resistant container to dispose of the needles and syringes once you have finished with them. Do not reuse these items. Return the container to your doctor or health care professional for proper disposal.  Talk to your pediatrician regarding the use of this medicine in children. Special care may be needed.  Overdosage: If you think you have taken too much of this medicine contact a poison control center or emergency room at once.  NOTE: This medicine is only for you. Do not share this  medicine with others.  What if I miss a dose?  If you miss a dose, take it as soon as you can. If it is almost time for your next dose, take only that dose. Do not take double or extra doses.  What may interact with this medicine?  Do not take this medicine with any of the following medications:  -aspirin and aspirin-like medicines  -heparin  -mifepristone  -palifermin  -warfarin   This medicine may also interact with the following medications:  -cilostazol  -clopidogrel  -dipyridamole  -NSAIDs, medicines for pain and inflammation, like ibuprofen or naproxen  -sulfinpyrazone  -ticlopidine  This list may not describe all possible interactions. Give your health care provider a list of all the medicines, herbs, non-prescription drugs, or dietary supplements you use. Also tell them if you smoke, drink alcohol, or use illegal drugs. Some items may interact with your medicine.  What should I watch for while using this medicine?  Visit your doctor or health care professional for regular checks on your progress. Your condition will be monitored carefully while you are receiving this medicine.  If you are going to have surgery, tell your doctor or health care professional that you are taking this medicine.  Do not stop taking this medicine without first talking to your doctor. Be sure to refill your prescription before you run out of medicine.  Avoid sports and activities that might cause injury while you are using this medicine. Severe falls or injuries can cause unseen bleeding. Be careful when using sharp tools or knives. Consider using an electric razor. Take special care brushing or flossing your teeth. Report any injuries, bruising, or red spots on the skin to your doctor or health care professional.  What side effects may I notice from receiving this medicine?  Side effects that you should report to your doctor or health care professional as soon as possible:  -allergic reactions like skin rash, itching or hives,  swelling of the face, lips, or tongue  -dark urine  -feeling faint or lightheaded, falls  -fever  -heavy menstrual bleeding  -signs and symptoms of bleeding such as bloody or black, tarry stools; red or dark-brown urine; spitting up blood or brown material that looks like coffee grounds; red spots on the skin; unusual bruising or bleeding from the eye, gums, or nose  -signs and symptoms of a blood clot such as breathing problems; changes in vision; chest pain; severe, sudden headache; pain, swelling, warmth in the leg; trouble speaking; sudden numbness or weakness of the face, arm or leg  Side effects that usually do not require medical attention (report to your doctor or health care professional if they continue or are bothersome):  -pain or irritation at the injection site  This list may not describe all possible side effects. Call your doctor for medical advice about side effects. You may report side effects to FDA at 5-279-FDA-6895.  Where should I keep my medicine?  Keep out of the reach of children.  Store at room temperature between 15 and 30 degrees C (59 and 86 degrees F). Do not freeze. If your injections have been specially prepared, you may need to store them in the refrigerator. Ask your pharmacist. Throw away any unused medicine after the expiration date.  NOTE: This sheet is a summary. It may not cover all possible information. If you have questions about this medicine, talk to your doctor, pharmacist, or health care provider.  © 2014, [employer]. (3/31/2014 10:04:27 AM)      · Is patient discharged on Warfarin / Coumadin?   No     Depression / Suicide Risk    [facility], it is important to learn how to keep safe from harming yourself.    Recognize the warning signs:  · Abrupt changes in personality, positive or negative- including increase in energy   · Giving away possessions  · Change in eating patterns- significant weight changes-  positive  or negative  · Change in sleeping patterns- unable to sleep or sleeping all the time   · Unwillingness or inability to communicate  · Depression  · Unusual sadness, discouragement and loneliness  · Talk of wanting to die  · Neglect of personal appearance   · Rebelliousness- reckless behavior  · Withdrawal from people/activities they love  · Confusion- inability to concentrate     If you or a loved one observes any of these behaviors or has concerns about self-harm, here's what you can do:  · Talk about it- your feelings and reasons for harming yourself  · Remove any means that you might use to hurt yourself (examples: pills, rope, extension cords, firearm)  · Get professional help from the community (Mental Health, Substance Abuse, psychological counseling)  · Do not be alone:Call your Safe Contact- someone whom you trust who will be there for you.  · Call your local CRISIS HOTLINE 284-3755 or 585-270-6377  · Call your local Children's Mobile Crisis Response Team [place] (530) 097-1164 or www.[employer]imuth  · Call the toll free National Suicide Prevention Hotlines   · National Suicide Prevention Lifeline 307-395-VCNI (0082)  · National Hope Line Network 800-SUICIDE (998-3494)
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
## 2018-01-18 NOTE — PROGRESS NOTES
Pain OK  + EPL/FPL/IO  Fingers warm  + DF/PF  D/c home  NWB RUE/RLE  Lovenox x 2 weeks  F/u 1/24    Reviewed opioid precautions/risk assessment with patient  At risk of abuse, h/o heroin use  However, no adequate alternative for postop pain management  Will wean off over 2-4 weeks

## 2018-01-18 NOTE — FACE TO FACE
Face to Face Note  -  Durable Medical Equipment    Sukumar hGotra M.D. - NPI: [id number]  I certify that this patient is under my care and that they had a durable medical equipment(DME)face to face encounter by myself that meets the physician DME face-to-face encounter requirements with this patient on:    Date of encounter:   Patient:                    MRN:                       YOB: 2020  Geovany Saldaña  [id number]  1990     The encounter with the patient was in whole, or in part, for the following medical condition, which is the primary reason for durable medical equipment:  Post-Op Surgery    I certify that, based on my findings, the following durable medical equipment is medically necessary:  Wheelchair    HOME O2 Saturation Measurements:(Values must be present for Home Oxygen orders)         ,     ,         My Clinical findings support the need for the above equipment due to:  Bedbound/non-weight bearing    Supporting Symptoms: Pain

## 2018-01-18 NOTE — DISCHARGE PLANNING
Medical Social Work    Per pts chart, pt needs a DME referral. SW met with pt at bedside who requested that referrals be sent to [facility]. SW addressed all questions and encouraged follow up as needed. SW sent the choice form to [facility] and confirmed receipt of choice form. SW to monitor response status and follow up with care team.
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
## 2018-01-19 NOTE — PROGRESS NOTES
Pt discharged home. Pt received personal wheelchair. Prescriptions given to pt and consent signed and in chart. Discharge instructions reviewed and all questions addressed. PIV removed.

## 2018-08-09 NOTE — PROGRESS NOTES
Report received. Assumed care of pt. A/O x4. VSS. Anxious and tearful.c/o of pain, medicated per MAR. Assessment complete, splint to RUE and immobilizer to RLE. Discussed POC, safety, pain control, surgery, pt verbalizes understanding. Explained importance of calling before getting OOB. Call light and belongings within reach. Bed in the lowest position. Treaded socks in place. Hourly rounding in progress. Will continue to monitor .   none

## 2024-12-09 ENCOUNTER — APPOINTMENT (OUTPATIENT)
Dept: RADIOLOGY | Facility: MEDICAL CENTER | Age: 34
End: 2024-12-09
Attending: STUDENT IN AN ORGANIZED HEALTH CARE EDUCATION/TRAINING PROGRAM
Payer: MEDICAID

## 2024-12-09 ENCOUNTER — HOSPITAL ENCOUNTER (EMERGENCY)
Facility: MEDICAL CENTER | Age: 34
End: 2024-12-09
Attending: STUDENT IN AN ORGANIZED HEALTH CARE EDUCATION/TRAINING PROGRAM
Payer: MEDICAID

## 2024-12-09 VITALS
HEART RATE: 94 BPM | OXYGEN SATURATION: 97 % | SYSTOLIC BLOOD PRESSURE: 176 MMHG | TEMPERATURE: 98.4 F | BODY MASS INDEX: 25.03 KG/M2 | WEIGHT: 146.61 LBS | HEIGHT: 64 IN | RESPIRATION RATE: 16 BRPM | DIASTOLIC BLOOD PRESSURE: 103 MMHG

## 2024-12-09 DIAGNOSIS — S62.665A CLOSED NONDISPLACED FRACTURE OF DISTAL PHALANX OF LEFT RING FINGER, INITIAL ENCOUNTER: ICD-10-CM

## 2024-12-09 DIAGNOSIS — I15.9 SECONDARY HYPERTENSION: ICD-10-CM

## 2024-12-09 DIAGNOSIS — Z71.6 ENCOUNTER FOR COUNSELING FOR TOBACCO USE DISORDER: ICD-10-CM

## 2024-12-09 DIAGNOSIS — I73.00 RAYNAUD'S PHENOMENON WITHOUT GANGRENE: ICD-10-CM

## 2024-12-09 LAB
ALBUMIN SERPL BCP-MCNC: 3.6 G/DL (ref 3.2–4.9)
ALBUMIN/GLOB SERPL: 1.4 G/DL
ALP SERPL-CCNC: 73 U/L (ref 30–99)
ALT SERPL-CCNC: 53 U/L (ref 2–50)
ANION GAP SERPL CALC-SCNC: 10 MMOL/L (ref 7–16)
AST SERPL-CCNC: 36 U/L (ref 12–45)
BILIRUB SERPL-MCNC: 0.3 MG/DL (ref 0.1–1.5)
BUN SERPL-MCNC: 16 MG/DL (ref 8–22)
CALCIUM ALBUM COR SERPL-MCNC: 9 MG/DL (ref 8.5–10.5)
CALCIUM SERPL-MCNC: 8.7 MG/DL (ref 8.5–10.5)
CHLORIDE SERPL-SCNC: 107 MMOL/L (ref 96–112)
CO2 SERPL-SCNC: 22 MMOL/L (ref 20–33)
CREAT SERPL-MCNC: 1.01 MG/DL (ref 0.5–1.4)
CRP SERPL HS-MCNC: <0.3 MG/DL (ref 0–0.75)
GFR SERPLBLD CREATININE-BSD FMLA CKD-EPI: 74 ML/MIN/1.73 M 2
GLOBULIN SER CALC-MCNC: 2.6 G/DL (ref 1.9–3.5)
GLUCOSE SERPL-MCNC: 131 MG/DL (ref 65–99)
POTASSIUM SERPL-SCNC: 4 MMOL/L (ref 3.6–5.5)
PROT SERPL-MCNC: 6.2 G/DL (ref 6–8.2)
SODIUM SERPL-SCNC: 139 MMOL/L (ref 135–145)

## 2024-12-09 PROCEDURE — 29130 APPL FINGER SPLINT STATIC: CPT

## 2024-12-09 PROCEDURE — 99284 EMERGENCY DEPT VISIT MOD MDM: CPT

## 2024-12-09 PROCEDURE — 86140 C-REACTIVE PROTEIN: CPT

## 2024-12-09 PROCEDURE — 29125 APPL SHORT ARM SPLINT STATIC: CPT

## 2024-12-09 PROCEDURE — 99406 BEHAV CHNG SMOKING 3-10 MIN: CPT

## 2024-12-09 PROCEDURE — 700102 HCHG RX REV CODE 250 W/ 637 OVERRIDE(OP): Mod: UD | Performed by: STUDENT IN AN ORGANIZED HEALTH CARE EDUCATION/TRAINING PROGRAM

## 2024-12-09 PROCEDURE — 73140 X-RAY EXAM OF FINGER(S): CPT | Mod: LT

## 2024-12-09 PROCEDURE — 302874 HCHG BANDAGE ACE 2 OR 3""

## 2024-12-09 PROCEDURE — 36415 COLL VENOUS BLD VENIPUNCTURE: CPT

## 2024-12-09 PROCEDURE — 80053 COMPREHEN METABOLIC PANEL: CPT

## 2024-12-09 PROCEDURE — A9270 NON-COVERED ITEM OR SERVICE: HCPCS | Mod: UD | Performed by: STUDENT IN AN ORGANIZED HEALTH CARE EDUCATION/TRAINING PROGRAM

## 2024-12-09 RX ORDER — ACETAMINOPHEN 500 MG
1000 TABLET ORAL ONCE
Status: COMPLETED | OUTPATIENT
Start: 2024-12-09 | End: 2024-12-09

## 2024-12-09 RX ORDER — AMLODIPINE BESYLATE 10 MG/1
10 TABLET ORAL DAILY
Qty: 60 TABLET | Refills: 1 | Status: SHIPPED | OUTPATIENT
Start: 2024-12-09

## 2024-12-09 RX ORDER — AMLODIPINE BESYLATE 5 MG/1
5 TABLET ORAL ONCE
Status: COMPLETED | OUTPATIENT
Start: 2024-12-09 | End: 2024-12-09

## 2024-12-09 RX ORDER — IBUPROFEN 600 MG/1
600 TABLET, FILM COATED ORAL ONCE
Status: COMPLETED | OUTPATIENT
Start: 2024-12-09 | End: 2024-12-09

## 2024-12-09 RX ADMIN — AMLODIPINE BESYLATE 5 MG: 5 TABLET ORAL at 22:14

## 2024-12-09 RX ADMIN — ACETAMINOPHEN 1000 MG: 500 TABLET ORAL at 22:14

## 2024-12-09 RX ADMIN — IBUPROFEN 600 MG: 600 TABLET, FILM COATED ORAL at 22:14

## 2024-12-10 NOTE — ED NOTES
Patient discharged home per ERP.  Discharge teaching and education discussed with patient. POC discussed.   Patient verbalized understanding of discharge teaching and education. No other questions at this time.     RX sent to pt's pharmacy.     VSS. Patient alert and oriented. Patient arranged ride for self. Able to ambulate off unit safely with steady gait.

## 2024-12-10 NOTE — ED TRIAGE NOTES
"Chief Complaint   Patient presents with    Hand Swelling     Patient reports 2 weeks of bilateral hand numbness and pain. Denies recent injury or trauma       Patient to triage ambulatory with a steady gait, AAOx4, Appropriate precautions in place.     Explained wait time and triage process. Placed back in lobby. Told to notify ED tech or RN of any changes, verbalized understanding.    BP (!) 167/114   Pulse (!) 105   Temp 36.3 °C (97.3 °F) (Temporal)   Resp 18   Ht 1.626 m (5' 4\")   Wt 66.5 kg (146 lb 9.7 oz)   SpO2 95%   BMI 25.16 kg/m²     "

## 2024-12-10 NOTE — ED PROVIDER NOTES
CHIEF COMPLAINT  Chief Complaint   Patient presents with    Hand Swelling     Patient reports 2 weeks of bilateral hand numbness and pain. Denies recent injury or trauma       LIMITATION TO HISTORY   Select:     JUNAID Saldaña is a 34 y.o. female who presents to the Emergency Department for hand pain recent swelling and episodes of numbness, particularly affecting the pinky, ring, and middle fingers of both hands. These symptoms are triggered by activity and alleviated with shaking or rubbing the hands. No prior history of similar symptoms is noted, though they have been more noticeable recently. The patient smokes daily and has a history of hands feeling cold. They also report trauma to the left hand, specifically the ring finger, after striking it against a hard surface the previous night. No associated symptoms in the feet or other locations.    OUTSIDE HISTORIAN(S):  Select:    EXTERNAL RECORDS REVIEWED  Select:       PAST MEDICAL HISTORY  History reviewed. No pertinent past medical history.  .    SURGICAL HISTORY  Past Surgical History:   Procedure Laterality Date    ORIF, ELBOW Right 1/16/2018    Procedure: ELBOW ORIF;  Surgeon: Sukumar Ghotra M.D.;  Location: SURGERY [facility];  Service: Orthopedics    ORIF, FRACTURE, TIBIA, PLATEAU Right 1/16/2018    Procedure: TIBIA PLATEAU ORIF;  Surgeon: Sukumar Ghotra M.D.;  Location: SURGERY [facility];  Service: Orthopedics         FAMILY HISTORY  History reviewed. No pertinent family history.       SOCIAL HISTORY  Social History     Socioeconomic History    Marital status:      Spouse name: Not on file    Number of children: Not on file    Years of education: Not on file    Highest education level: Not on file   Occupational History    Not on file   Tobacco Use    Smoking status: Every Day     Current packs/day: 0.50     Types: Cigarettes    Smokeless tobacco: Never   Substance and Sexual Activity    Alcohol use: No     Comment:  "denies    Drug use: Yes     Types: Inhaled     Comment: pot, hx IV drugs    Sexual activity: Not on file   Other Topics Concern    Not on file   Social History Narrative    ** Merged History Encounter **          Social Drivers of Health     Financial Resource Strain: Not on file   Food Insecurity: Not on file   Transportation Needs: Not on file   Physical Activity: Not on file   Stress: Not on file   Social Connections: Not on file   Intimate Partner Violence: Not on file   Housing Stability: Not on file         CURRENT MEDICATIONS  No current facility-administered medications on file prior to encounter.     Current Outpatient Medications on File Prior to Encounter   Medication Sig Dispense Refill    ibuprofen (MOTRIN) 200 MG Tab Take 2 Tabs by mouth every 8 hours as needed. With food      enoxaparin (LOVENOX) 40 MG/0.4ML Solution inj Inject 40 mg as instructed every day. 20 Vial 0    hydrocodone-acetaminophen (VICODIN) 5-500 MG TABS Take 1-2 Tabs by mouth every four hours as needed. 10 Each 0    ondansetron (ZOFRAN) 4 MG TABS Take 1 Tab by mouth every 8 hours as needed for Nausea/Vomiting. 5 Each 0           ALLERGIES  No Known Allergies    PHYSICAL EXAM  VITAL SIGNS:BP (!) 159/92   Pulse (!) 103   Temp 36.3 °C (97.3 °F) (Temporal)   Resp 18   Ht 1.626 m (5' 4\")   Wt 66.5 kg (146 lb 9.7 oz)   SpO2 98%   BMI 25.16 kg/m²       GENERAL: Awake and alert  HEAD: Normocephalic and atraumatic  NECK: Normal range of motion, without meningismus  EYES: Pupils Equal, Round, Reactive to Light, extraocular movements intact, conjunctiva white  ENT: Mucous membranes moist, oropharynx clear  PULMONARY: Normal effort, clear to auscultation  CARDIOVASCULAR: No murmurs, clicks or rubs, radial and ulnar pulses 2+  ABDOMINAL: Soft, non-tender, no guarding or rigidity present, no pulsatile masses  BACK: no midline tenderness, no costovertebral tenderness  NEUROLOGICAL: Grossly non-focal neurological examination, speech normal, " gait normal  Left hand: Swelling noted with tenderness over the distal phalanx of the ring finger; X-ray confirms fracture of the left distal phalanx.  Bilateral hands: cold to touch, pallor with delayed capillary refill of this skin and nails  No signs of edema or other extremity trauma.  SKIN: Warm and dry.  PSYCHIATRIC: Affect is appropriate    DIAGNOSTIC STUDIES / PROCEDURES  EKG    Splint Application  At 11:15 PM  Splint was applied to the left ring finger finger splint  Circulation, sensation, and motion were intact post-splint application    Counseling on tobacco cessation:  I spent 5 minutes disccusing with the patient tobacco cessation and treatment options.      LABS  Labs Reviewed   COMP METABOLIC PANEL - Abnormal; Notable for the following components:       Result Value    Glucose 131 (*)     ALT(SGPT) 53 (*)     All other components within normal limits   CRP QUANTITIVE (NON-CARDIAC)   ESTIMATED GFR         RADIOLOGY  I have independently interpreted the diagnostic imaging associated with this visit and am waiting the final reading from the radiologist.   My preliminary interpretation is as follows: fracture    Formal Radiologist interpretation:  DX-FINGER(S) 2+ LEFT   Final Result         1.  Ring finger distal phalanx head and neck fracture          COURSE & MEDICAL DECISION MAKING    ED COURSE:        INTERVENTIONS BY ME:  Medications   acetaminophen (Tylenol) tablet 1,000 mg (1,000 mg Oral Given 12/9/24 2214)   ibuprofen (Motrin) tablet 600 mg (600 mg Oral Given 12/9/24 2214)   amLODIPine (Norvasc) tablet 5 mg (5 mg Oral Given 12/9/24 2214)       Response on recheck:      INITIAL ASSESSMENT, COURSE AND PLAN  Care Narrative:         This is a 34-year-old female presenting with bilateral hand numbness and pain, recent left-hand trauma, and cold intolerance. The patient’s symptoms raise concern for conditions including Raynaud's phenomenon, peripheral neuropathy, and vascular occlusion. Given the  bilateral symptoms triggered by activity and relieved by shaking, a compressive neuropathy, such as carpal tunnel syndrome or ulnar neuropathy, is considered. However, the clinical picture is concerning for vascular compromise, as evidenced by cold hands, pallor, and delayed capillary refill, supporting a diagnosis of Raynaud's phenomenon. Trauma to the left ring finger with an associated distal phalanx fracture is a secondary but related issue.    Diagnostic evaluation confirmed a fracture of the left distal phalanx. An abnormal metabolic panel with elevated glucose and ALT may suggest systemic factors warranting further outpatient evaluation. A normal CRP suggests no active systemic inflammation. The lack of signs of acute limb ischemia or severe vascular compromise mitigates the immediate risk of gangrene or critical ischemia.    Treatment today included splint application for the fracture, counseling on tobacco cessation to address a modifiable risk factor for Raynaud's exacerbation, and administration of antihypertensive medication given elevated BP. The patient was discharged with a splint, instructions to avoid exacerbating activities, and advice to follow up for vascular and neurological evaluation. Return precautions include worsening numbness, color changes to the hand, or uncontrolled pain.     ADDITIONAL PROBLEM LIST    DISPOSITION AND DISCUSSIONS  Discussion of management with other QHP or appropriate source(s): None     I have discussed management of the patient with the following physicians and ROGELIO's:          Barriers to care at this time, including but not limited to:     Decision tools and prescription drugs considered including, but not limited to:   Prescribed amlodipine        FINAL DIAGNOSIS  1. Raynaud's phenomenon without gangrene    2. Encounter for counseling for tobacco use disorder    3. Closed nondisplaced fracture of distal phalanx of left ring finger, initial encounter    4. Secondary  hypertension             Electronically signed by: Gustabo Tejada DO ,11:34 PM 12/09/24

## (undated) DEVICE — HEAD HOLDER JUNIOR/ADULT

## (undated) DEVICE — TUBING CLEARLINK DUO-VENT - C-FLO (48EA/CA)

## (undated) DEVICE — SUTURE ETHILON 2-0 FSLX 30 (36PK/BX)"

## (undated) DEVICE — SUTURE 2-0 VICRYL PLUS CT-1 - 8 X 18 INCH(12/BX)

## (undated) DEVICE — SPONGE GAUZESTER 4 X 4 4PLY - (128PK/CA)

## (undated) DEVICE — ELECTRODE 850 FOAM ADHESIVE - HYDROGEL RADIOTRNSPRNT (50/PK)

## (undated) DEVICE — SODIUM CHL IRRIGATION 0.9% 1000ML (12EA/CA)

## (undated) DEVICE — STOCKINET BIAS 6 IN STERILE - (20/CA)

## (undated) DEVICE — POUCH FLUID COLLECTION INVISISHIELD - (10/BX)

## (undated) DEVICE — DRILL BIT AO 1.5 X 110MM - (2CSX1+2MFLX2+MDFTX2=8)

## (undated) DEVICE — GLOVE BIOGEL INDICATOR SZ 7.5 SURGICAL PF LTX - (50PR/BX 4BX/CA)

## (undated) DEVICE — GLOVE SZ 6 BIOGEL PI MICRO - PF LF (50PR/BX 4BX/CA)

## (undated) DEVICE — GLOVE BIOGEL INDICATOR SZ 8 SURGICAL PF LTX - (50/BX 4BX/CA)

## (undated) DEVICE — PACK MAJOR ORTHO - (2EA/CA)

## (undated) DEVICE — GLOVE BIOGEL PI ORTHO SZ 6 SURGICAL PF LF (40PR/BX)

## (undated) DEVICE — GOWN WARMING STANDARD FLEX - (30/CA)

## (undated) DEVICE — DRAPE C-ARM LARGE 41IN X 74 IN - (10/BX 2BX/CA)

## (undated) DEVICE — KIT ROOM DECONTAMINATION

## (undated) DEVICE — SET LEADWIRE 5 LEAD BEDSIDE DISPOSABLE ECG (1SET OF 5/EA)

## (undated) DEVICE — DRESSING XEROFORM 1X8 - (50/BX 4BX/CA)

## (undated) DEVICE — PENCIL ELECTSURG 10FT BTN SWH - (50/CA)

## (undated) DEVICE — PAD LAP STERILE 18 X 18 - (5/PK 40PK/CA)

## (undated) DEVICE — GLOVE BIOGEL SZ 7.5 SURGICAL PF LTX - (50PR/BX 4BX/CA)

## (undated) DEVICE — STOCKINET BIAS 4 IN STERILE - (20/CA)

## (undated) DEVICE — DRAPE LARGE 3 QUARTER - (20/CA)

## (undated) DEVICE — DRAIN PENROSE STERILE 1/4 X - 18 IN  (25EA/BX)

## (undated) DEVICE — Device

## (undated) DEVICE — TUBE CONNECT SUCTION CLEAR 120 X 1/4" (50EA/CA)"

## (undated) DEVICE — PADDING CAST 4 IN STERILE - 4 X 4 YDS (24/CA)

## (undated) DEVICE — GLOVE BIOGEL ECLIPSE PF LATEX SIZE 8.0  (50PR/BX)

## (undated) DEVICE — DRILL BIT 2.5 TWIST 310.25 (8TX2+1TX3=19)

## (undated) DEVICE — NEEDLE NON SAFETY HYPO 22 GA X 1 1/2 IN (100/BX)

## (undated) DEVICE — GLOVE BIOGEL SZ 8 SURGICAL PF LTX - (50PR/BX 4BX/CA)

## (undated) DEVICE — DRAPE SURGICAL U 77X120 - (10/CA)

## (undated) DEVICE — PROTECTOR ULNA NERVE - (36PR/CA)

## (undated) DEVICE — KIT ANESTHESIA W/CIRCUIT & 3/LT BAG W/FILTER (20EA/CA)

## (undated) DEVICE — DRESSING TRANSPARENT FILM TEGADERM 4 X 4.75" (50EA/BX)"

## (undated) DEVICE — GLOVE BIOGEL SZ 6.5 SURGICAL PF LTX (50PR/BX 4BX/CA)

## (undated) DEVICE — TRAY CATHETER FOLEY URINE METER W/STATLOCK 350ML (10EA/CA)

## (undated) DEVICE — GLOVE BIOGEL PI ORTHO SZ 7 PF LF (40PR/BX)

## (undated) DEVICE — SLEEVE, VASO, THIGH, MED

## (undated) DEVICE — PADDING CAST 6 IN STERILE - 6 X 4 YDS (24/CA)

## (undated) DEVICE — SUTURE 0 VICRYL PLUS CT-1 - 8 X 18 INCH (12/BX)

## (undated) DEVICE — TOWELS CLOTH SURGICAL - (4/PK 20PK/CA)

## (undated) DEVICE — DRAPE LOWER EXTREMETY - (6/CA)

## (undated) DEVICE — SUTURE GENERAL

## (undated) DEVICE — SET EXTENSION WITH 2 PORTS (48EA/CA) ***PART #2C8610 IS A SUBSTITUTE*****

## (undated) DEVICE — SYRINGE 30 ML LL (56/BX)

## (undated) DEVICE — TUBE E-T HI-LO CUFF 7.0MM (10EA/PK)

## (undated) DEVICE — LACTATED RINGERS INJ 1000 ML - (14EA/CA 60CA/PF)

## (undated) DEVICE — SENSOR SPO2 NEO LNCS ADHESIVE (20/BX) SEE USER NOTES

## (undated) DEVICE — SUTURE 1 VICRYL PLUS CT-1 - 18 INCH (12/BX)

## (undated) DEVICE — SPLINT PLASTER 5 IN X 30 IN - (50EA/BX 6BX/CA)

## (undated) DEVICE — DRILL BIT QC 2.0 140MM DEPTH MRK - (6TX2+1TX3=15)

## (undated) DEVICE — GOWN SURGEONS X-LARGE - DISP. (30/CA)

## (undated) DEVICE — NEPTUNE 4 PORT MANIFOLD - (20/PK)

## (undated) DEVICE — MASK ANESTHESIA ADULT  - (100/CA)

## (undated) DEVICE — GLOVE BIOGEL PI INDICATOR SZ 7.0 SURGICAL PF LF - (50/BX 4BX/CA)

## (undated) DEVICE — GLOVE SZ 7 BIOGEL PI MICRO - PF LF (50PR/BX 4BX/CA)

## (undated) DEVICE — GLOVE BIOGEL PI INDICATOR SZ 6.5 SURGICAL PF LF - (50/BX 4BX/CA)

## (undated) DEVICE — CANISTER SUCTION 3000ML MECHANICAL FILTER AUTO SHUTOFF MEDI-VAC NONSTERILE LF DISP  (40EA/CA)

## (undated) DEVICE — CHLORAPREP 26 ML APPLICATOR - ORANGE TINT(25/CA)

## (undated) DEVICE — SUCTION INSTRUMENT YANKAUER BULBOUS TIP W/O VENT (50EA/CA)